# Patient Record
Sex: MALE | Race: BLACK OR AFRICAN AMERICAN | NOT HISPANIC OR LATINO | URBAN - METROPOLITAN AREA
[De-identification: names, ages, dates, MRNs, and addresses within clinical notes are randomized per-mention and may not be internally consistent; named-entity substitution may affect disease eponyms.]

---

## 2022-01-01 ENCOUNTER — HISTORICAL (OUTPATIENT)
Dept: ADMINISTRATIVE | Facility: HOSPITAL | Age: 42
End: 2022-01-01

## 2022-01-01 LAB
BUN SERPL-MCNC: 14.1 MG/DL (ref 8.9–20.6)
CALCIUM SERPL-MCNC: 9.3 MG/DL (ref 8.7–10.5)
CHLORIDE SERPL-SCNC: 101 MMOL/L (ref 98–107)
CO2 SERPL-SCNC: 25 MMOL/L (ref 22–29)
CREAT SERPL-MCNC: 7.45 MG/DL (ref 0.73–1.18)
CREAT/UREA NIT SERPL: 2
GLUCOSE SERPL-MCNC: 90 MG/DL (ref 74–100)
HEMOLYSIS INTERF INDEX SERPL-ACNC: 6
ICTERIC INTERF INDEX SERPL-ACNC: 3
LIPEMIC INTERF INDEX SERPL-ACNC: 3
POTASSIUM SERPL-SCNC: 3.3 MMOL/L (ref 3.5–5.1)
SODIUM SERPL-SCNC: 137 MMOL/L (ref 136–145)

## 2022-05-14 NOTE — OP NOTE
DATE OF SURGERY:    03/21/2022    SURGEON:  Chris Flanagan MD    PREOPERATIVE DIAGNOSES:    1. End-stage renal disease.  2. Sickle cell disease.  3. Chronic central venous occlusion.    POSTOPERATIVE DIAGNOSES:    1. End-stage renal disease.  2. Sickle cell disease.  3. Chronic central venous occlusion.    PROCEDURES:    1. Attempted right arm HeRO dialysis graft placement, aborted.  2. Venogram of right internal jugular vein.  3. Venogram of superior vena cava from left femoral vein access.    HISTORY OF PRESENTING ILLNESS:  Mr. Cabrera Lee is a 42-year-old gentleman with some known chronic central venous occlusion as well as end-stage renal disease and sickle cell disease.  There was some hope that we could cross his central venous occlusion and gain central venous access with a wire.  With this, we hoped to place a HeRO dialysis graft outflow device and associated graft.  He is currently dialyzing through a right femoral catheter.  Given his young age, we were hoping to provide better access and allow for removal of his catheter.    PROCEDURE IN DETAIL:  Mr. Lee was taken to the operating room, placed in supine position, where general anesthesia was initiated.  His right arm, right neck, chest, and bilateral groins were prepped and draped in the usual sterile fashion.  B-mode ultrasound was utilized to identify the external jugular vein.  We were able to cannulate this with a Mini Stick needle, utilized our Mini Stick wire and guided it in, and eventually placed our Mini Stick sheath.  Contrast injection was performed.  There were multiple collaterals and occlusion of the external jugular vein but diversion of flow into a collateral system that ultimately crossed the midline and appeared to rejoin the left innominate vein and subsequently drain into superior vena cava, amongst other routes.  We tried for some time to identify any small residual channels that would give us direct access to the  superior vena cava but were unable to achieve this with this specific cannulation.  We then were able to cannulate the right internal jugular vein, which again terminated centrally and only gave rise to multiple collateral vessels, none of which allowed us direct access to the central venous system.  After multiple attempts, we ultimately then gained access to the left femoral vein.  We utilized an 18-gauge needle under ultrasound guidance to cannulate the left common femoral vein, and a 6-[place] sheath was placed.  Glidewire and a Glidecath were advanced to the chest.  We were able to enter the superior vena cava, hit a hard obstruction, and flow was only allowed to divert to the left innominate vein.  Contrast injection was not helpful and did not demonstrate any type of specific suggestion to the connection with the innominate vein.  We tried for some time with a wire, but unable to engage any specific site that was suggestive enough.  I did not feel more aggressive wire probing was judicious given the lack of anatomic evidence as to the proper site of navigation of our wire.  As such, I felt that we could not proceed safely and achieve the wire access needed to complete the HeRO graft.  At this point, the decision was made to discontinue the procedure.  All wires and sheaths were removed.  Pressure was held.  Good hemostasis was obtained.  This completed our procedure on Mr. Cabrera Lee.  His next best option will be for left thigh dialysis graft placement.  We will have him in the clinic soon for evaluation, discussion in regard to placing a left leg dialysis graft.        ______________________________  Chris Flanagan MD    [facility]/UG  DD:  03/21/2022  Time:  11:37AM  DT:  03/21/2022  Time:  12:11PM  Job #:  [id number]

## 2022-09-28 PROBLEM — N18.6 ESRD (END STAGE RENAL DISEASE): Status: ACTIVE | Noted: 2022-01-01

## 2022-09-28 PROBLEM — D57.1 SICKLE CELL DISEASE WITHOUT CRISIS: Status: ACTIVE | Noted: 2022-01-01
